# Patient Record
Sex: FEMALE | Race: WHITE | NOT HISPANIC OR LATINO | Employment: OTHER | URBAN - METROPOLITAN AREA
[De-identification: names, ages, dates, MRNs, and addresses within clinical notes are randomized per-mention and may not be internally consistent; named-entity substitution may affect disease eponyms.]

---

## 2017-09-13 NOTE — PATIENT DISCUSSION
- Patient now sees well at distance without glasses.  Recommend OTC readers for near, +2.50 OU, with +1.50 OU for computer work

## 2017-09-13 NOTE — PATIENT DISCUSSION
- Recommend limiting Visine use as much as possible. Try Retaine, Refresh, or Systane for chronic use.

## 2022-06-14 ENCOUNTER — ESTABLISHED PATIENT (OUTPATIENT)
Dept: URBAN - METROPOLITAN AREA CLINIC 7 | Facility: CLINIC | Age: 32
End: 2022-06-14

## 2022-06-14 DIAGNOSIS — H52.13: ICD-10-CM

## 2022-06-14 PROCEDURE — 92014 COMPRE OPH EXAM EST PT 1/>: CPT

## 2022-06-14 PROCEDURE — 92015 DETERMINE REFRACTIVE STATE: CPT

## 2022-06-14 PROCEDURE — 92310C CONTACT LENS 75

## 2022-06-14 ASSESSMENT — KERATOMETRY
OS_K2POWER_DIOPTERS: 45.00
OD_AXISANGLE2_DEGREES: 86
OD_K2POWER_DIOPTERS: 45.00
OS_AXISANGLE_DEGREES: 23
OS_K1POWER_DIOPTERS: 44.75
OD_AXISANGLE_DEGREES: 176
OS_AXISANGLE2_DEGREES: 113
OD_K1POWER_DIOPTERS: 44.50

## 2022-06-14 ASSESSMENT — TONOMETRY
OS_IOP_MMHG: 17
OD_IOP_MMHG: 16

## 2022-06-14 ASSESSMENT — VISUAL ACUITY
OS_CC: 20/25+1
OU_CC: 20/20
OD_CC: 20/25+1

## 2023-06-27 ASSESSMENT — KERATOMETRY
OD_K2POWER_DIOPTERS: 45.00
OS_K1POWER_DIOPTERS: 44.75
OD_K1POWER_DIOPTERS: 44.50
OS_AXISANGLE2_DEGREES: 113
OS_K2POWER_DIOPTERS: 45.00
OD_AXISANGLE_DEGREES: 176
OD_AXISANGLE2_DEGREES: 86
OS_AXISANGLE_DEGREES: 23

## 2023-06-28 ENCOUNTER — PREPPED CHART (OUTPATIENT)
Dept: URBAN - METROPOLITAN AREA CLINIC 10 | Facility: CLINIC | Age: 33
End: 2023-06-28

## 2023-08-14 ENCOUNTER — ESTABLISHED PATIENT (OUTPATIENT)
Dept: URBAN - METROPOLITAN AREA CLINIC 10 | Facility: CLINIC | Age: 33
End: 2023-08-14

## 2023-08-14 DIAGNOSIS — H52.13: ICD-10-CM

## 2023-08-14 PROCEDURE — 92310C CONTACT LENS 75

## 2023-08-14 PROCEDURE — 92015 DETERMINE REFRACTIVE STATE: CPT

## 2023-08-14 PROCEDURE — 92014 COMPRE OPH EXAM EST PT 1/>: CPT

## 2023-08-14 ASSESSMENT — KERATOMETRY
OS_K1POWER_DIOPTERS: 44.50
OS_AXISANGLE_DEGREES: 65
OS_K2POWER_DIOPTERS: 44.75
OD_K1POWER_DIOPTERS: 44.50
OS_AXISANGLE2_DEGREES: 155
OD_AXISANGLE2_DEGREES: 85
OD_AXISANGLE_DEGREES: 175
OD_K2POWER_DIOPTERS: 45.00

## 2023-08-14 ASSESSMENT — TONOMETRY
OD_IOP_MMHG: 16
OS_IOP_MMHG: 18

## 2023-08-14 ASSESSMENT — VISUAL ACUITY
OD_CC: 20/20
OS_CC: 20/25+1

## 2024-08-19 ENCOUNTER — COMPREHENSIVE EXAM (OUTPATIENT)
Dept: URBAN - METROPOLITAN AREA CLINIC 10 | Facility: CLINIC | Age: 34
End: 2024-08-19

## 2024-08-19 DIAGNOSIS — H52.13: ICD-10-CM

## 2024-08-19 PROCEDURE — 92310C CONTACT LENS 75

## 2024-08-19 PROCEDURE — 92015 DETERMINE REFRACTIVE STATE: CPT

## 2024-08-19 PROCEDURE — 92014 COMPRE OPH EXAM EST PT 1/>: CPT

## 2024-08-19 ASSESSMENT — KERATOMETRY
OS_K2POWER_DIOPTERS: 45.00
OS_K1POWER_DIOPTERS: 44.75
OS_AXISANGLE_DEGREES: 5
OD_K1POWER_DIOPTERS: 44.50
OD_AXISANGLE_DEGREES: 175
OD_AXISANGLE2_DEGREES: 85
OS_AXISANGLE2_DEGREES: 95
OD_K2POWER_DIOPTERS: 45.25

## 2024-08-19 ASSESSMENT — VISUAL ACUITY
OS_CC: 20/20
OD_CC: 20/20
OU_CC: 20/20

## 2024-08-19 ASSESSMENT — TONOMETRY
OD_IOP_MMHG: 25
OS_IOP_MMHG: 23

## 2025-08-20 ENCOUNTER — COMPREHENSIVE EXAM (OUTPATIENT)
Age: 35
End: 2025-08-20

## 2025-08-20 DIAGNOSIS — H52.13: ICD-10-CM

## 2025-08-20 PROCEDURE — 92015 DETERMINE REFRACTIVE STATE: CPT

## 2025-08-20 PROCEDURE — 92014 COMPRE OPH EXAM EST PT 1/>: CPT

## 2025-08-20 PROCEDURE — 92310-1 LEVEL 1 SOFT LENS UPDATE
